# Patient Record
Sex: MALE | URBAN - METROPOLITAN AREA
[De-identification: names, ages, dates, MRNs, and addresses within clinical notes are randomized per-mention and may not be internally consistent; named-entity substitution may affect disease eponyms.]

---

## 2018-09-21 ENCOUNTER — TELEPHONE (OUTPATIENT)
Dept: NEUROLOGY | Facility: HOSPITAL | Age: 63
End: 2018-09-21

## 2018-09-21 DIAGNOSIS — C18.9 ADENOCARCINOMA, COLON: Primary | ICD-10-CM

## 2018-09-21 DIAGNOSIS — C7A.8 PRIMARY MALIGNANT NEUROENDOCRINE NEOPLASM OF LUNG: Primary | ICD-10-CM

## 2018-09-21 NOTE — TELEPHONE ENCOUNTER
----- Message from Neris Ramsey sent at 9/21/2018  8:01 AM CDT -----  Regarding: NEW  Contact: [phone]  New patient - Who called:[phone]    [facility] [phone]/Dr Preston    Why do you need to be seen? Poorly differentiated neoplasm not consistent with colon cancer.    Which doctor are you requesting? Juan Daniel    You may contact patient back to schedule at:     [facility]  Testing was sent off on 9/14/18

## 2018-09-21 NOTE — TELEPHONE ENCOUNTER
----- Message from Sumit Frances DO, FACP sent at 9/20/2018  9:54 PM CDT -----  Need recent FDG PET, full treatment records and path review.      ----- Message -----  From: Alyse Chapin RN  Sent: 9/20/2018   3:59 PM  To: Lorin Bishop, RN, Leonor Smith, RN, #    Good afternoon,    I received a referral for the above pt for Dx: High grade (poorly differentiated Neuroendocrine Carcinoma. Referred from Dr. Darden. Records are under the media tab with the path for Neuroendocrine on pg 20. Please let me know what additional information you may need.    Thanks,  Alyse BOJORQUEZ

## 2018-09-21 NOTE — TELEPHONE ENCOUNTER
----- Message from Fior Barnes sent at 9/21/2018 10:59 AM CDT -----  Contact: [phone]  EAW/NEW- Patient was returning nurse's call. Call back number is [phone]

## 2018-09-21 NOTE — TELEPHONE ENCOUNTER
New NET referral from Dr. Darden.  PT has hx of adeno of colon in 2017 which he received FOLFOX and avastin.  Recent broch bx revealed high grade NET.  Left message for patient to return call on voice mail to discuss appointment and testing needed for new pt wendy.

## 2018-09-21 NOTE — LETTER
September 21, 2018        An Darden MD  [address] [facility] [place] [address]             [facility] [place] [address]  Phone: [phone]  Fax: [phone]   Patient: Edmond Traore   MR Number: [id number]   YOB: 1955   Date of Visit: 9/21/2018     Dear Dr. Darden,     We contacted Mr. Traore regarding setting up an appointment for an evaluation at our center.  His is scheduled for an appointment with Dr. Sumit Frances on 10/15/18 for recommendations.  We will forward you a copy of the clinic note after the visit.      Thank you for considering our program and for referring this patient.  If you have any questions, please do not hesitate to contact us.        Sincerely,      Leonor Smith, KATHIN, RN, ONN-CG  Nurse Navigator Neuroendocrine Tumor Program

## 2018-09-21 NOTE — TELEPHONE ENCOUNTER
Spoke with pt, he recently had a FDG PET.  He will get cd and report to the office.  Requested path re read per protocol.  Marylin made with md.

## 2018-09-21 NOTE — TELEPHONE ENCOUNTER
Sent msg to Maura to get both path spec re read.    ----- Message from Sumit Frances DO, FACP sent at 9/21/2018  4:19 PM CDT -----  Both    ----- Message -----  From: Leonor Smith RN  Sent: 9/21/2018   9:00 AM  To: Sumit Frances DO, FACP, #    Do you want a path re read on his colon cancer in 2017 or new lung bx of high grade NET? Or both?    ----- Message -----  From: Sumit Frances DO, FACP  Sent: 9/20/2018   9:54 PM  To: Lorin Bishop, RN, Leonor Smith, RN, #    Need recent FDG PET, full treatment records and path review.      ----- Message -----  From: Alyse Chapin RN  Sent: 9/20/2018   3:59 PM  To: Lorin Bishop, RN, Leonor Smith, RN, #    Good afternoon,    I received a referral for the above pt for Dx: High grade (poorly differentiated Neuroendocrine Carcinoma. Referred from Dr. Darden. Records are under the media tab with the path for Neuroendocrine on pg 20. Please let me know what additional information you may need.    Thanks,  Alyse BOJORQUEZ

## 2018-10-15 ENCOUNTER — INITIAL CONSULT (OUTPATIENT)
Dept: HEMATOLOGY/ONCOLOGY | Facility: CLINIC | Age: 63
End: 2018-10-15
Payer: COMMERCIAL

## 2018-10-15 VITALS
OXYGEN SATURATION: 97 % | BODY MASS INDEX: 22.33 KG/M2 | RESPIRATION RATE: 16 BRPM | SYSTOLIC BLOOD PRESSURE: 143 MMHG | HEIGHT: 72 IN | HEART RATE: 128 BPM | DIASTOLIC BLOOD PRESSURE: 88 MMHG | WEIGHT: 164.88 LBS | TEMPERATURE: 99 F

## 2018-10-15 DIAGNOSIS — Z85.038 HISTORY OF COLON CANCER: ICD-10-CM

## 2018-10-15 DIAGNOSIS — C7A.1 MALIGNANT POORLY DIFFERENTIATED NEUROENDOCRINE CARCINOMA: ICD-10-CM

## 2018-10-15 PROCEDURE — 99205 OFFICE O/P NEW HI 60 MIN: CPT | Mod: S$GLB,,, | Performed by: INTERNAL MEDICINE

## 2018-10-15 PROCEDURE — 99999 PR PBB SHADOW E&M-EST. PATIENT-LVL III: CPT | Mod: PBBFAC,,, | Performed by: INTERNAL MEDICINE

## 2018-10-15 PROCEDURE — 3008F BODY MASS INDEX DOCD: CPT | Mod: CPTII,S$GLB,, | Performed by: INTERNAL MEDICINE

## 2018-10-15 RX ORDER — AMIODARONE HYDROCHLORIDE 200 MG/1
200 TABLET ORAL
COMMUNITY
Start: 2018-08-31

## 2018-10-15 NOTE — PROGRESS NOTES
PATIENT: Edmond Traore  MRN: [id number]  DATE: 10/16/2018      Diagnosis:   1. Malignant poorly differentiated neuroendocrine carcinoma    2. History of colon cancer        Chief Complaint: Consult      Oncologic History:      Oncologic History Colon cancer diagnosed 2017  Possible liver metastasis at presentation  High-grade neuroendocrine carcinoma mediastinal lymph nodes diagnosed 09/2018    Oncologic Treatment Hemicolectomy 2017  FOLFOX with bevacizumab  Irinotecan/bevacizumab x 6 cycles completed 6/2017     Pathology Poorly differentiated carcinoma with neuroendocrine differentiation, Ki-67 100%          Subjective:    Interval History: Mr. Traore is a 63 y.o. male who is seen as an initial visit for a high-grade neuroendocrine carcinoma.  His history dates to 2017 when he was diagnosed with colorectal cancer.  He underwent surgical resection and was found to have node positive disease.  He underwent a PET-CT following this and was noted to have some uptake within the liver, however, this was not biopsied and he was started on treatment for metastatic disease with FOLFOX and bevacizumab.  He only had a few cycles of this and discontinued due to intolerance.  He was then started on treatment with irinotecan and bevacizumab with plans to surgically resect the disease in the liver following 6 cycles.  After completion of this he underwent another PET-CT which had not shown any evidence distant disease.  He then went on to have surveillance.  In 08/2018 he underwent another PET-CT which showed hypermetabolism within the chest.  Particularly he was found to have lymph node hypermetabolism in the left internal mammary chain and right pericardial region.  There is also hypermetabolic lymph nodes in the left aortocaval space.  On 08/30/2018 he underwent a bronchoscopy, right thoracotomy and biopsy masses on the parietal pleura and pericardial fat.  Pathology had shown a poorly differentiated carcinoma with  neuroendocrine differentiation with Ki-67 of 100%.    He states he generally feels well. He denies any chest pain, shortness of breath, wheezing.  His weight has been stable.  He denies any abdominal pain, nausea, vomiting, diarrhea.  He is otherwise fully active and without complaints.    Past Medical History:   Past Medical History:   Diagnosis Date    Asthma     Atrial fibrillation     Colon cancer 2017    H/O cardiac radiofrequency ablation 08/2017    History of colon cancer 10/16/2018    Hx antineoplastic chemotherapy     FOLFOX & Avastin    Torres syndrome     MSH6 mutation    Malignant poorly differentiated neuroendocrine carcinoma 10/15/2018    Melanoma in situ 02/2018    Mitral regurgitation     Sarcoidosis        Past Surgical HIstory:   Past Surgical History:   Procedure Laterality Date    BRONCHOSCOPY  08/2018    partial colectomy  2017    adeno carcinoma    TUNNELED VENOUS PORT PLACEMENT  02/2017    VATS - right Right 08/2018    converted to thoracomtomy for bx       Family History:   Family History   Problem Relation Age of Onset    Cancer Father         colon    Cancer Paternal Grandfather        Social History:  reports that  has never smoked. he has never used smokeless tobacco. He reports that he does not drink alcohol or use drugs.    Allergies:  Review of patient's allergies indicates:  No Known Allergies    Medications:  Current Outpatient Medications   Medication Sig Dispense Refill    amiodarone (PACERONE) 200 MG Tab Take 200 mg by mouth.      apixaban (ELIQUIS) 5 mg Tab Take 5 mg by mouth 2 (two) times daily.       No current facility-administered medications for this visit.        Review of Systems   Constitutional: Negative for appetite change, chills, fatigue, fever and unexpected weight change.   HENT: Negative for dental problem, sinus pressure and sneezing.    Eyes: Negative for visual disturbance.   Respiratory: Negative for cough, choking and chest tightness.     Cardiovascular: Negative for chest pain and leg swelling.   Gastrointestinal: Negative for abdominal pain, blood in stool, constipation, diarrhea and nausea.   Genitourinary: Negative for difficulty urinating and dysuria.   Musculoskeletal: Negative for arthralgias and back pain.   Skin: Negative for rash and wound.   Neurological: Negative for dizziness, light-headedness and headaches.   Hematological: Negative for adenopathy. Does not bruise/bleed easily.   Psychiatric/Behavioral: Negative for sleep disturbance. The patient is not nervous/anxious.        ECOG Performance Status:   ECOG SCORE    0 - Fully active-able to carry on all pre-disease performance without restriction         Objective:      Vitals:   Vitals:    10/15/18 1402   BP: (!) 143/88   BP Location: Right arm   Patient Position: Sitting   BP Method: Large (Automatic)   Pulse: (!) 128   Resp: 16   Temp: 99.2 °F (37.3 °C)   TempSrc: Oral   SpO2: 97%   Weight: 74.8 kg (164 lb 14.5 oz)   Height: 6' (1.829 m)     BMI: Body mass index is 22.37 kg/m².    Physical Exam   Constitutional: He is oriented to person, place, and time. He appears well-developed and well-nourished.   HENT:   Head: Normocephalic and atraumatic.   Eyes: Pupils are equal, round, and reactive to light.   Neck: Normal range of motion. Neck supple.   Cardiovascular: Normal rate and regular rhythm.   Pulmonary/Chest: Effort normal and breath sounds normal. No respiratory distress.   Abdominal: Soft. He exhibits no distension. There is no tenderness.   Musculoskeletal: He exhibits no edema or tenderness.   Lymphadenopathy:     He has no cervical adenopathy.   Neurological: He is alert and oriented to person, place, and time. No cranial nerve deficit.   Skin: Skin is warm and dry.   Psychiatric: He has a normal mood and affect. His behavior is normal.       Laboratory Data:  No visits with results within 1 Week(s) from this visit.   Latest known visit with results is:   No results found  for any previous visit.     FINAL PATHOLOGIC DIAGNOSIS  Outside slide review  Original procedure date: 8/30/2018  H&E and immunostain slides only received  1. Pleural mass (excision):  Nonnecrotizing epithelioid granulomas  2. Pleural mass (excisional biopsy):  Multiple well-circumscribed nonnecrotizing epithelioid granulomas  3. Pericardium, right pericardial mass (excisional biopsy):  Poorly differentiated carcinoma with neuroendocrine differentiation  Immunostains  Synaptophysin: Positive, 2+ staining, 50% of cells, 1+ staining, 50% of cells  CD 56: Focally rarely positive  KeratinAe1/ae3 and CAM 5.2: Positive  Ki-67: Positive, 100%  Negative stains: NSE, P 16, p63  Included immunostains with appropriate positive and negative controls  [facility]  Diagnosed by: Belén Marvin M.D.  (Electronically Signed: 2018-10-05 15:19:54)    Imaging:   Outside images reviewed.       Assessment:       1. Malignant poorly differentiated neuroendocrine carcinoma    2. History of colon cancer           Plan:     I have had a long conversation with Mr. Traore and his wife today concerning his disease.  We have reviewed his imaging including his most recent FDG PET-CT.  His pathology which was reread at our institution shows a poorly differentiated carcinoma with neuroendocrine differentiation and Ki-67 of 100%.  He has had molecular testing sent and we are awaiting results.  He does not demonstrate a large burden of disease, however, has uptake on his PET-CT involving the right pericardial region and possibly within abdominal lymph nodes. At this point he is going to follow back up with Dr. Darden and have spoken with him today.  If he has a targetable mutation then he may benefit from targeted therapy.  Also, depending what mutations are identified we may have a clinical trial targeting these mutations.  If he does not then systemic chemotherapy may be his best option would recommend platinum and etoposide.  Additionally, if he has a  high tumor mutational burden then immunotherapy may be a good option should he progress on systemic chemotherapy.      Sumit Frances DO, FACP  Hematology & Oncology  [address] [place] [address]  ph. [phone]  Fax. [phone]    60 minutes were spent in coordination of patient's care, record review and counseling.  More than 50% of the time was face-to-face.

## 2018-10-15 NOTE — LETTER
October 16, 2018      [facility] - Hematology Oncology  [address]  [place] [address]  Phone: [phone]          Patient: Edmond Traore   MR Number: [id number]   YOB: 1955   Date of Visit: 10/15/2018       Dear Dr. An Darden:    Thank you for referring Edmond Traore to me for evaluation. Attached you will find relevant portions of my assessment and plan of care.    If you have questions, please do not hesitate to call me. I look forward to following Edmond Traore along with you.    Sincerely,    Sumit Frances DO, FACP    Enclosure  CC:  No Recipients    If you would like to receive this communication electronically, please contact [email or web address] or [phone] to request more information on [employer] access.    For providers and/or their staff who would like to refer a patient to [facility], please contact us through our one-stop-shop provider referral line, Rickey Smith, at [phone].    If you feel you have received this communication in error or would no longer like to receive these types of communications, please e-mail [email or web address]

## 2018-10-16 PROBLEM — Z85.038 HISTORY OF COLON CANCER: Status: ACTIVE | Noted: 2018-10-16

## 2021-03-29 ENCOUNTER — TELEPHONE (OUTPATIENT)
Dept: NEUROLOGY | Facility: HOSPITAL | Age: 66
End: 2021-03-29